# Patient Record
Sex: MALE | Race: WHITE | Employment: FULL TIME | URBAN - METROPOLITAN AREA
[De-identification: names, ages, dates, MRNs, and addresses within clinical notes are randomized per-mention and may not be internally consistent; named-entity substitution may affect disease eponyms.]

---

## 2023-07-20 ENCOUNTER — HOSPITAL ENCOUNTER (EMERGENCY)
Facility: HOSPITAL | Age: 23
Discharge: HOME | End: 2023-07-20
Attending: EMERGENCY MEDICINE
Payer: OTHER MISCELLANEOUS

## 2023-07-20 ENCOUNTER — APPOINTMENT (OUTPATIENT)
Dept: CT IMAGING | Facility: HOSPITAL | Age: 23
End: 2023-07-20
Attending: EMERGENCY MEDICINE
Payer: OTHER MISCELLANEOUS

## 2023-07-20 VITALS
TEMPERATURE: 100 F | SYSTOLIC BLOOD PRESSURE: 111 MMHG | HEIGHT: 71 IN | BODY MASS INDEX: 30.38 KG/M2 | HEART RATE: 67 BPM | RESPIRATION RATE: 16 BRPM | OXYGEN SATURATION: 95 % | DIASTOLIC BLOOD PRESSURE: 66 MMHG | WEIGHT: 217 LBS

## 2023-07-20 DIAGNOSIS — S09.90XA INJURY OF HEAD, INITIAL ENCOUNTER: Primary | ICD-10-CM

## 2023-07-20 DIAGNOSIS — J34.1 MUCOUS RETENTION CYST OF MAXILLARY SINUS: ICD-10-CM

## 2023-07-20 DIAGNOSIS — Q04.9 MEGA CISTERNA MAGNA (HCC): ICD-10-CM

## 2023-07-20 PROCEDURE — 70450 CT HEAD/BRAIN W/O DYE: CPT | Performed by: EMERGENCY MEDICINE

## 2023-07-20 PROCEDURE — 99285 EMERGENCY DEPT VISIT HI MDM: CPT

## 2023-07-20 PROCEDURE — 99284 EMERGENCY DEPT VISIT MOD MDM: CPT

## 2023-07-20 NOTE — ED INITIAL ASSESSMENT (HPI)
PT PRESENTS TO ED AFTER HE HIT HEAD ON BEAM AT WORK ON TUESDAY, HAS BEEN WAKING UP WITH HEADACHES SINCE, DENIES VISION CHANGES, NO LOC, PAIN TO TOP LEFT SIDE OF HEAD